# Patient Record
Sex: FEMALE | Race: WHITE | Employment: FULL TIME | URBAN - NONMETROPOLITAN AREA
[De-identification: names, ages, dates, MRNs, and addresses within clinical notes are randomized per-mention and may not be internally consistent; named-entity substitution may affect disease eponyms.]

---

## 2021-12-28 ENCOUNTER — HOSPITAL ENCOUNTER (EMERGENCY)
Age: 21
Discharge: HOME | End: 2021-12-28
Attending: EMERGENCY MEDICINE
Payer: COMMERCIAL

## 2021-12-28 VITALS
WEIGHT: 125 LBS | HEART RATE: 119 BPM | RESPIRATION RATE: 16 BRPM | BODY MASS INDEX: 20.83 KG/M2 | OXYGEN SATURATION: 98 % | TEMPERATURE: 100 F | HEIGHT: 65 IN | DIASTOLIC BLOOD PRESSURE: 60 MMHG | SYSTOLIC BLOOD PRESSURE: 131 MMHG

## 2021-12-28 DIAGNOSIS — U07.1 COVID-19: Primary | ICD-10-CM

## 2021-12-28 LAB
FLU A ANTIGEN: NEGATIVE
FLU B ANTIGEN: NEGATIVE
SARS-COV-2, NAAT: DETECTED

## 2021-12-28 PROCEDURE — 87635 SARS-COV-2 COVID-19 AMP PRB: CPT

## 2021-12-28 PROCEDURE — 99284 EMERGENCY DEPT VISIT MOD MDM: CPT

## 2021-12-28 PROCEDURE — 87804 INFLUENZA ASSAY W/OPTIC: CPT

## 2021-12-28 PROCEDURE — 6370000000 HC RX 637 (ALT 250 FOR IP): Performed by: EMERGENCY MEDICINE

## 2021-12-28 RX ORDER — ASCORBIC ACID 500 MG
500 TABLET ORAL 2 TIMES DAILY
Qty: 14 TABLET | Refills: 0 | Status: SHIPPED | OUTPATIENT
Start: 2021-12-28 | End: 2022-01-04

## 2021-12-28 RX ORDER — IBUPROFEN 200 MG
600 TABLET ORAL ONCE
Status: COMPLETED | OUTPATIENT
Start: 2021-12-28 | End: 2021-12-28

## 2021-12-28 RX ORDER — ZINC SULFATE 50(220)MG
50 CAPSULE ORAL DAILY
Qty: 7 CAPSULE | Refills: 0 | Status: SHIPPED | OUTPATIENT
Start: 2021-12-28 | End: 2022-01-10

## 2021-12-28 RX ADMIN — IBUPROFEN 600 MG: 200 TABLET, FILM COATED ORAL at 00:52

## 2021-12-28 ASSESSMENT — PAIN DESCRIPTION - DESCRIPTORS
DESCRIPTORS: DISCOMFORT;CONSTANT
DESCRIPTORS: DISCOMFORT

## 2021-12-28 ASSESSMENT — PAIN SCALES - GENERAL
PAINLEVEL_OUTOF10: 7
PAINLEVEL_OUTOF10: 5
PAINLEVEL_OUTOF10: 7

## 2021-12-28 ASSESSMENT — PAIN DESCRIPTION - LOCATION
LOCATION: BACK
LOCATION: HEAD

## 2021-12-28 ASSESSMENT — PAIN DESCRIPTION - ORIENTATION: ORIENTATION: LOWER

## 2021-12-28 NOTE — ED TRIAGE NOTES
Patient reported, \"fever, cough started tonight. I thought it was a migraine today and I took Excedrin migraine around 1600. My brother was diagnosed with Covid 2 weeks ago and I went and got tested at that time and was negative. Yesterday I developed a slight sore throat but that is gone. My heart was beating so fast I couldn't sleep and the fever keeps going up. \" Observed patient resp easy, warm and dry.

## 2021-12-28 NOTE — ED PROVIDER NOTES
[address] [facility]  [facility] [phone]  Phone: [address]    Chief Complaint   Patient presents with    Fever       HPI    Patel Briones is a 24 y.o. female who presents with noted complaint. Patient has been doing okay developed little bit of rhinorrhea last couple days. That got a little bit better now is developed high fever. No other associate symptoms such as vomiting or diarrhea. Little rhinorrhea. Little sore throat improved. Some back pain and myalgias. Had exposure to brother with Covid although that was sometime ago.     PAST MEDICAL HISTORY    Past Medical History:   Diagnosis Date    Migraines        SURGICAL HISTORY    Past Surgical History:   Procedure Laterality Date    APPENDECTOMY  3/12    Dr. Stephane Dale  2007    left side of head-cyst removed       CURRENT MEDICATIONS    Current Outpatient Rx   Medication Sig Dispense Refill    Aspirin-Acetaminophen-Caffeine (EXCEDRIN MIGRAINE PO) Take by mouth      ascorbic acid (VITAMIN C) 500 MG tablet Take 1 tablet by mouth 2 times daily for 7 days 14 tablet 0    zinc sulfate (ZINCATE) 220 (50 Zn) MG capsule Take 1 capsule by mouth daily for 7 days 7 capsule 0       ALLERGIES    Allergies   Allergen Reactions    Cefaclor Hives       FAMILY HISTORY    Family History   Problem Relation Age of Onset    Urolithiasis Father        SOCIAL HISTORY    Social History     Socioeconomic History    Marital status: Single     Spouse name: None    Number of children: None    Years of education: None    Highest education level: None   Occupational History    Occupation: student   Tobacco Use    Smoking status: Never Smoker    Smokeless tobacco: Never Used   Vaping Use    Vaping Use: Never used   Substance and Sexual Activity    Alcohol use: No    Drug use: No    Sexual activity: None   Other Topics Concern    None   Social History Narrative  None     Social Determinants of Health     Financial Resource Strain:     Difficulty of Paying Living Expenses: Not on file   Food Insecurity:     Worried About Running Out of Food in the Last Year: Not on file    Jairon of Food in the Last Year: Not on file   Transportation Needs:     Lack of Transportation (Medical): Not on file    Lack of Transportation (Non-Medical): Not on file   Physical Activity:     Days of Exercise per Week: Not on file    Minutes of Exercise per Session: Not on file   Stress:     Feeling of Stress : Not on file   Social Connections:     Frequency of Communication with Friends and Family: Not on file    Frequency of Social Gatherings with Friends and Family: Not on file    Attends Christian Services: Not on file    Active Member of [address] [place] [address] [employer] or Organizations: Not on file    Attends Club or Organization Meetings: Not on file    Marital Status: Not on file   Intimate Partner Violence:     Fear of Current or Ex-Partner: Not on file    Emotionally Abused: Not on file    Physically Abused: Not on file    Sexually Abused: Not on file   Housing Stability:     Unable to Pay for Housing in the Last Year: Not on file    Number of [place] in the Last Year: Not on file    Unstable Housing in the Last Year: Not on file       REVIEW OF SYSTEMS    Positive for recent URI and cough. Neck pain chest pain or abdominal pain or shortness of breath. Positive for some recurrent headaches. All systems negative except as marked.      PHYSICAL EXAM    VITAL SIGNS: BP (!) 146/71   Pulse 135   Temp 101.6 °F (38.7 °C) (Oral)   Resp 16   Ht 5' 5\" (1.651 m)   Wt 125 lb (56.7 kg)   LMP 12/19/2021 (Exact Date)   SpO2 96%   BMI 20.80 kg/m²    Constitutional:  Alert not toxic or ill, able to give coherent history pleasant alert  HENT:  normocephalic, Atraumatic,  Bilateral external ears normal, Oropharynx moist, No oral exudates, Nose normal.  TMs normal  Cervical Spine: Normal range of motion,  No stridor. No tenderness, Supple,  Eyes:  No discharge or  Swelling,Conjunctiva normal, PERRL, EOMI,  Respiratory: No respiratory distress, Normal breath sounds,  No wheezing, No chest tenderness. Cardiovascular:  Normal heart rate, Normal rhythm, No murmurs, No rubs, No gallops. GI:  No reproducible pain, Bowel sounds normal, Soft, No masses, No pulsatile masses. No tenderness  Musculoskeletal:  Intact distal pulses, No edema, No tenderness, No cyanosis, No clubbing. Good range of motion in all major joints. No tenderness to palpation or major deformities noted. Back:No tenderness. Integument:  Warm, Dry, No erythema, No rash (on exposed areas)   Lymphatic:  No lymphadenopathy noted. Neurologic:  Alert & oriented x 3, Normal motor function, Normal sensory function, No focal deficits noted. Psychiatric:  Affect normal, Judgment normal, Mood normal.     EKG                      RADIOLOGY    No orders to display       PROCEDURES    none      CONSULTS:  None      CRITICAL CARE:  None      SCREENINGS  BP (!) 146/71   Pulse 135   Temp 101.6 °F (38.7 °C) (Oral)   Resp 16   Ht 5' 5\" (1.651 m)   Wt 125 lb (56.7 kg)   LMP 12/19/2021 (Exact Date)   SpO2 96%   BMI 20.80 kg/m²        Screening For Hypertension and Follow-up (#317)  patient informed that blood pressure is abnormal and in the pre-hypertension range and should be rechecked by primary care      Screening For Tobacco Use and Cessation Intervention (#226):   reports that she has never smoked. She has never used smokeless tobacco.  Non-smoker not applicable for screen      ED COURSE & MEDICAL DECISION MAKING    Pertinent Labs & Imaging studies reviewed. (See chart for details)  Patient has acute febrile illness. Symptoms been present for several days. Does have some myalgias and body aches. Could represent a viral or flulike illness. She has got no vomiting or diarrhea. She has no urinary symptoms currently.   Checking Covid test. She is outside the window for further treatment in regards to influenza. She has no urinary symptoms to suggest UTI/pyelonephritis        REASSESSMENT  1:06 AM  Patient rechecked and updated on lab/xray status, progress and results. Patient was reassessed and condition was unchanged after treatment Motrin for pain. . Needs nothing else at this time. Covid test is positive. Recommend supportive care antipyretics etc.  Offered her nausea medication cough medication etc. she did not want any          ED Course as of 12/28/21 0106   Tue Dec 28, 2021   0058 Covid test is positive at this time. Recommend further evaluation as an outpatient. Reassured patient in regards to care and treatment.   Continue fever medication and monitor. [SY]      ED Course User Index  [SY] Americo Sharma MD          FINAL IMPRESSION    1. COVID-19         PATIENT REFERRED TO:  Brittany Simons MD  [address] [place] [facility] [address]  [phone]    Call   For evaluation      DISCHARGE MEDICATIONS:  New Prescriptions    ASCORBIC ACID (VITAMIN C) 500 MG TABLET    Take 1 tablet by mouth 2 times daily for 7 days    ZINC SULFATE (ZINCATE) 220 (50 ZN) MG CAPSULE    Take 1 capsule by mouth daily for 7 days           Americo Sharma MD  12/28/21 0106

## 2021-12-28 NOTE — ED NOTES
Observed patient resp easy, warm and dry, sitting on the bed. Patient stable for discharge, instructions provided. Patient educated on medications, pain control, isolation, and symptoms management, and follow up. Patient verbalized understanding, questions answered. Observed patient steadily ambulate from the department after discharge with family.       Yaa Brizuela RN  12/28/21 3092

## 2021-12-28 NOTE — Clinical Note
Ines Go was seen and treated in our emergency department on 12/28/2021. She may return to school on 01/03/2022. If no fever or symptoms and recommended mask until January 7    If you have any questions or concerns, please don't hesitate to call.       Prabha Abad MD

## 2021-12-29 ENCOUNTER — TELEPHONE (OUTPATIENT)
Dept: FAMILY MEDICINE CLINIC | Age: 21
End: 2021-12-29

## 2021-12-29 ENCOUNTER — CARE COORDINATION (OUTPATIENT)
Dept: CARE COORDINATION | Age: 21
End: 2021-12-29

## 2021-12-29 NOTE — ACP (ADVANCE CARE PLANNING)
Advance Care Planning   Healthcare Decision Maker:    Primary Decision Maker: Faith Boles - Parent - [phone]    Primary Decision Maker: Carlotta Ojeda - Parent - [phone]    Today we documented Decision Maker(s) consistent with Legal Next of Kin hierarchy. Healthcare Decision Maker information reviewed, updated, and verified with patient.

## 2021-12-29 NOTE — CARE COORDINATION
Attempted to reach patient for covid-19 f/u s/p recent [facility] ED visit for c/o fever. COVID-19 testing was completed and results were positive. Patient was not available at the time of my call and generic voicemail message was left asking patient to please return call to my direct number. Will continue to attempt to f/u with patient in the future.

## 2021-12-29 NOTE — CARE COORDINATION
Patient contacted regarding COVID-19 diagnosis. Discussed COVID-19 related testing which was available at this time. Test results were positive. Patient informed of results, if available? Patient states she is aware of COVID-19 results and they were again reviewed with patient during our call. Ambulatory Care Manager contacted the patient by telephone to perform post discharge assessment. Call within 2 business days of discharge: Yes. Verified name and  with patient as identifiers. Provided introduction to self, and explanation of the CTN/ACM role, and reason for call due to risk factors for infection and/or exposure to COVID-19. Symptoms reviewed with patient who verbalized the following symptoms: fever, no new symptoms and no worsening symptoms. Due to no new or worsening symptoms encounter was not routed to provider for escalation. Discussed follow-up appointments. If no appointment was previously scheduled, appointment scheduling offered: Patient will call and schedule PCP f/u appointment and she declined any assistance. [address] follow up appointment(s): No future appointments. Non-[facility] follow up appointment(s): N/A     Non-face-to-face services provided:  Obtained and reviewed discharge summary and/or continuity of care documents  Education of patient/family/caregiver/guardian to support self-management-COVID-19 education and zone information reviewed with patient. Patient was educated on signs/symptoms to report as well as the importance of early symptom recognition. ACM educated on need to self quarantine as directed. Advance Care Planning:   Does patient have an Advance Directive:  decision maker updated. ACP note completed. Educated patient about risk for severe COVID-19 due to risk factors according to CDC guidelines. ACM reviewed discharge instructions, medical action plan and red flag symptoms with the patient who verbalized understanding.  Discussed COVID vaccination status:

## 2022-01-10 ENCOUNTER — TELEMEDICINE (OUTPATIENT)
Dept: FAMILY MEDICINE CLINIC | Age: 22
End: 2022-01-10
Payer: COMMERCIAL

## 2022-01-10 DIAGNOSIS — U07.1 COVID-19: Primary | ICD-10-CM

## 2022-01-10 PROCEDURE — 99213 OFFICE O/P EST LOW 20 MIN: CPT | Performed by: FAMILY MEDICINE

## 2022-01-10 ASSESSMENT — ENCOUNTER SYMPTOMS
ABDOMINAL PAIN: 0
WHEEZING: 0
SHORTNESS OF BREATH: 0
DIARRHEA: 0
COUGH: 0
CONSTIPATION: 0
SORE THROAT: 0
RHINORRHEA: 0
NAUSEA: 0

## 2022-01-10 ASSESSMENT — PATIENT HEALTH QUESTIONNAIRE - PHQ9
SUM OF ALL RESPONSES TO PHQ QUESTIONS 1-9: 0
2. FEELING DOWN, DEPRESSED OR HOPELESS: 0
SUM OF ALL RESPONSES TO PHQ9 QUESTIONS 1 & 2: 0
SUM OF ALL RESPONSES TO PHQ QUESTIONS 1-9: 0
SUM OF ALL RESPONSES TO PHQ QUESTIONS 1-9: 0
1. LITTLE INTEREST OR PLEASURE IN DOING THINGS: 0
SUM OF ALL RESPONSES TO PHQ QUESTIONS 1-9: 0

## 2022-01-10 NOTE — PROGRESS NOTES
Head: Normocephalic and atraumatic. Eyes:      General: No scleral icterus. Pulmonary:      Effort: Pulmonary effort is normal. No respiratory distress. Skin:     Coloration: Skin is not jaundiced. Neurological:      Mental Status: She is alert and oriented to person, place, and time. Psychiatric:         Mood and Affect: Mood normal.         Behavior: Behavior normal.         Thought Content: Thought content normal.         Judgment: Judgment normal.         There were no vitals filed for this visit. Ronna Serrano, was evaluated through a synchronous (real-time) audio-video encounter. The patient (or guardian if applicable) is aware that this is a billable service. Verbal consent to proceed has been obtained within the past 12 months. The visit was conducted pursuant to the emergency declaration under the [address] [place] [address] authority and the [employer] General Act. Patient identification was verified, and a caregiver was present when appropriate. The patient was located in a state where the provider was credentialed to provide care. An electronic signature was used to authenticate this note.     --Fidel Whitt MD

## 2022-04-11 ENCOUNTER — TELEPHONE (OUTPATIENT)
Dept: FAMILY MEDICINE CLINIC | Age: 22
End: 2022-04-11

## 2022-04-11 NOTE — TELEPHONE ENCOUNTER
----- Message from Ceciliabebeto Sierra sent at 4/11/2022  9:46 AM EDT -----  Subject: Message to Provider    QUESTIONS  Information for Provider? Patient has lab orders from school to get blood   drawn since she got the Hep vaccines to see if she is immune to it and   wants to know if she can come to office to have done.  ---------------------------------------------------------------------------  --------------  [address]  What is the best way for the office to contact you? OK to leave message on   voicemail  Preferred Call Back Phone Number?  [phone]  ---------------------------------------------------------------------------  --------------  SCRIPT ANSWERS  undefined

## 2022-04-13 ENCOUNTER — NURSE ONLY (OUTPATIENT)
Dept: FAMILY MEDICINE CLINIC | Age: 22
End: 2022-04-13
Payer: COMMERCIAL

## 2022-04-13 DIAGNOSIS — Z11.59 NEED FOR HEPATITIS B SCREENING TEST: Primary | ICD-10-CM

## 2022-04-13 PROCEDURE — 36415 COLL VENOUS BLD VENIPUNCTURE: CPT | Performed by: FAMILY MEDICINE

## 2022-04-14 LAB — HEPATITIS B SURFACE ANTIGEN: NEGATIVE

## 2024-05-22 ENCOUNTER — OFFICE VISIT (OUTPATIENT)
Dept: PRIMARY CARE CLINIC | Age: 24
End: 2024-05-22
Payer: COMMERCIAL

## 2024-05-22 VITALS
HEIGHT: 65 IN | HEART RATE: 81 BPM | SYSTOLIC BLOOD PRESSURE: 118 MMHG | BODY MASS INDEX: 23.2 KG/M2 | RESPIRATION RATE: 16 BRPM | DIASTOLIC BLOOD PRESSURE: 70 MMHG | OXYGEN SATURATION: 99 % | WEIGHT: 139.25 LBS | TEMPERATURE: 98.2 F

## 2024-05-22 DIAGNOSIS — J02.0 STREP PHARYNGITIS: Primary | ICD-10-CM

## 2024-05-22 PROCEDURE — 99203 OFFICE O/P NEW LOW 30 MIN: CPT | Performed by: NURSE PRACTITIONER

## 2024-05-22 RX ORDER — PREDNISONE 20 MG/1
20 TABLET ORAL 2 TIMES DAILY
Qty: 10 TABLET | Refills: 0 | Status: SHIPPED | OUTPATIENT
Start: 2024-05-22 | End: 2024-05-27

## 2024-05-22 RX ORDER — AZITHROMYCIN 250 MG/1
250 TABLET, FILM COATED ORAL SEE ADMIN INSTRUCTIONS
Qty: 6 TABLET | Refills: 0 | Status: SHIPPED | OUTPATIENT
Start: 2024-05-22 | End: 2024-05-27

## 2024-05-22 ASSESSMENT — ENCOUNTER SYMPTOMS
SWOLLEN GLANDS: 1
COUGH: 1
SORE THROAT: 1

## 2024-05-22 NOTE — PROGRESS NOTES
Subjective:      Patient ID: Myrna Kwan is a 23 y.o. female coming in for   Chief Complaint   Patient presents with    Pharyngitis     She reports throat hurts, tightness, headache, coughing and its productive this has been going on with 2 weeks         Pharyngitis  This is a new problem. Episode onset: x2weeks. Associated symptoms include congestion, coughing, headaches, myalgias, a sore throat and swollen glands. Pertinent negatives include no fever. Treatments tried: nasonex, motrin as needed.       Review of Systems   Constitutional:  Negative for fever.   HENT:  Positive for congestion and sore throat.    Respiratory:  Positive for cough.    Musculoskeletal:  Positive for myalgias.   Neurological:  Positive for headaches.        Objective:/70 (Site: Left Upper Arm, Position: Sitting, Cuff Size: Medium Adult)   Pulse 81   Temp 98.2 °F (36.8 °C) (Tympanic)   Resp 16   Ht 1.651 m (5' 5\")   Wt 63.2 kg (139 lb 4 oz)   LMP 05/05/2024   SpO2 99%   BMI 23.17 kg/m²      Physical Exam  Vitals and nursing note reviewed.   Constitutional:       General: She is not in acute distress.     Appearance: Normal appearance. She is not ill-appearing.   HENT:      Head: Normocephalic.      Mouth/Throat:      Mouth: Mucous membranes are moist.      Pharynx: Oropharynx is clear. Posterior oropharyngeal erythema present.   Cardiovascular:      Rate and Rhythm: Normal rate and regular rhythm.      Heart sounds: Normal heart sounds.   Pulmonary:      Effort: Pulmonary effort is normal.      Breath sounds: Normal breath sounds.   Musculoskeletal:      Cervical back: Normal range of motion and neck supple.      Right lower leg: No edema.      Left lower leg: No edema.   Lymphadenopathy:      Cervical: No cervical adenopathy.   Skin:     General: Skin is warm and dry.      Findings: No rash.   Neurological:      General: No focal deficit present.      Mental Status: She is alert and oriented to person, place, and time.

## 2024-05-22 NOTE — PATIENT INSTRUCTIONS
Treating symptoms as strep throat with zpack which covers lungs and ears as well. Does not cover sinuses well. Prednisone for inflammation  F/u with pcp as needed.

## 2025-06-11 ENCOUNTER — RESULTS FOLLOW-UP (OUTPATIENT)
Dept: FAMILY MEDICINE CLINIC | Age: 25
End: 2025-06-11

## 2025-06-11 ENCOUNTER — OFFICE VISIT (OUTPATIENT)
Dept: FAMILY MEDICINE CLINIC | Age: 25
End: 2025-06-11
Payer: COMMERCIAL

## 2025-06-11 VITALS
RESPIRATION RATE: 18 BRPM | WEIGHT: 141.4 LBS | OXYGEN SATURATION: 98 % | HEIGHT: 65 IN | HEART RATE: 68 BPM | TEMPERATURE: 98.2 F | SYSTOLIC BLOOD PRESSURE: 110 MMHG | BODY MASS INDEX: 23.56 KG/M2 | DIASTOLIC BLOOD PRESSURE: 74 MMHG

## 2025-06-11 DIAGNOSIS — N92.0 MENORRHAGIA WITH REGULAR CYCLE: ICD-10-CM

## 2025-06-11 DIAGNOSIS — Z00.00 ANNUAL PHYSICAL EXAM: Primary | ICD-10-CM

## 2025-06-11 LAB
ALBUMIN SERPL BCG-MCNC: 3.9 G/DL (ref 3.4–4.9)
ALP SERPL-CCNC: 76 U/L (ref 38–126)
ALT SERPL W/O P-5'-P-CCNC: 6 U/L (ref 10–35)
ANION GAP SERPL CALC-SCNC: 9 MEQ/L (ref 8–16)
AST SERPL-CCNC: 26 U/L (ref 10–35)
BASOPHILS ABSOLUTE: 0 THOU/MM3 (ref 0–0.1)
BASOPHILS NFR BLD AUTO: 0.7 %
BILIRUB SERPL-MCNC: 0.4 MG/DL (ref 0.3–1.2)
BUN SERPL-MCNC: 11 MG/DL (ref 8–23)
CALCIUM SERPL-MCNC: 8.9 MG/DL (ref 8.6–10)
CHLORIDE SERPL-SCNC: 106 MEQ/L (ref 98–111)
CHOLEST SERPL-MCNC: 152 MG/DL (ref 100–199)
CO2 SERPL-SCNC: 22 MEQ/L (ref 22–29)
CREAT SERPL-MCNC: 0.9 MG/DL (ref 0.5–0.9)
DEPRECATED RDW RBC AUTO: 39.8 FL (ref 35–45)
EOSINOPHIL NFR BLD AUTO: 2.5 %
EOSINOPHILS ABSOLUTE: 0.1 THOU/MM3 (ref 0–0.4)
ERYTHROCYTE [DISTWIDTH] IN BLOOD BY AUTOMATED COUNT: 11.9 % (ref 11.5–14.5)
GFR SERPL CREATININE-BSD FRML MDRD: > 90 ML/MIN/1.73M2
GLUCOSE SERPL-MCNC: 92 MG/DL (ref 74–109)
HCT VFR BLD AUTO: 43.8 % (ref 37–47)
HDLC SERPL-MCNC: 66 MG/DL
HGB BLD-MCNC: 14.2 GM/DL (ref 12–16)
IMM GRANULOCYTES # BLD AUTO: 0.01 THOU/MM3 (ref 0–0.07)
IMM GRANULOCYTES NFR BLD AUTO: 0.2 %
LDLC SERPL CALC-MCNC: 70 MG/DL
LYMPHOCYTES ABSOLUTE: 2 THOU/MM3 (ref 1–4.8)
LYMPHOCYTES NFR BLD AUTO: 35.9 %
MCH RBC QN AUTO: 29.6 PG (ref 26–33)
MCHC RBC AUTO-ENTMCNC: 32.4 GM/DL (ref 32.2–35.5)
MCV RBC AUTO: 91.3 FL (ref 81–99)
MONOCYTES ABSOLUTE: 0.5 THOU/MM3 (ref 0.4–1.3)
MONOCYTES NFR BLD AUTO: 9.3 %
NEUTROPHILS ABSOLUTE: 2.9 THOU/MM3 (ref 1.8–7.7)
NEUTROPHILS NFR BLD AUTO: 51.4 %
NRBC BLD AUTO-RTO: 0 /100 WBC
PLATELET # BLD AUTO: 284 THOU/MM3 (ref 130–400)
PMV BLD AUTO: 10.2 FL (ref 9.4–12.4)
POTASSIUM SERPL-SCNC: 4.6 MEQ/L (ref 3.5–5.2)
PROT SERPL-MCNC: 7 G/DL (ref 6.4–8.3)
RBC # BLD AUTO: 4.8 MILL/MM3 (ref 4.2–5.4)
SODIUM SERPL-SCNC: 137 MEQ/L (ref 135–145)
TRIGL SERPL-MCNC: 78 MG/DL (ref 0–199)
TSH SERPL DL<=0.05 MIU/L-ACNC: 2.4 UIU/ML (ref 0.27–4.2)
WBC # BLD AUTO: 5.7 THOU/MM3 (ref 4.8–10.8)

## 2025-06-11 PROCEDURE — 99385 PREV VISIT NEW AGE 18-39: CPT | Performed by: FAMILY MEDICINE

## 2025-06-11 PROCEDURE — 36415 COLL VENOUS BLD VENIPUNCTURE: CPT | Performed by: FAMILY MEDICINE

## 2025-06-11 SDOH — ECONOMIC STABILITY: FOOD INSECURITY: WITHIN THE PAST 12 MONTHS, YOU WORRIED THAT YOUR FOOD WOULD RUN OUT BEFORE YOU GOT MONEY TO BUY MORE.: NEVER TRUE

## 2025-06-11 SDOH — ECONOMIC STABILITY: FOOD INSECURITY: WITHIN THE PAST 12 MONTHS, THE FOOD YOU BOUGHT JUST DIDN'T LAST AND YOU DIDN'T HAVE MONEY TO GET MORE.: NEVER TRUE

## 2025-06-11 ASSESSMENT — ENCOUNTER SYMPTOMS
DIARRHEA: 0
NAUSEA: 0
RHINORRHEA: 0
SORE THROAT: 0
APNEA: 0
SINUS PRESSURE: 0
CONSTIPATION: 0
COUGH: 0
BACK PAIN: 0
WHEEZING: 0
COLOR CHANGE: 0
SHORTNESS OF BREATH: 0
ABDOMINAL PAIN: 0
VOMITING: 0

## 2025-06-11 ASSESSMENT — PATIENT HEALTH QUESTIONNAIRE - PHQ9
SUM OF ALL RESPONSES TO PHQ QUESTIONS 1-9: 0
1. LITTLE INTEREST OR PLEASURE IN DOING THINGS: NOT AT ALL
1. LITTLE INTEREST OR PLEASURE IN DOING THINGS: NOT AT ALL
2. FEELING DOWN, DEPRESSED OR HOPELESS: NOT AT ALL
SUM OF ALL RESPONSES TO PHQ QUESTIONS 1-9: 0
SUM OF ALL RESPONSES TO PHQ QUESTIONS 1-9: 0
SUM OF ALL RESPONSES TO PHQ9 QUESTIONS 1 & 2: 0
2. FEELING DOWN, DEPRESSED OR HOPELESS: NOT AT ALL
SUM OF ALL RESPONSES TO PHQ QUESTIONS 1-9: 0

## 2025-06-11 NOTE — PROGRESS NOTES
2025    Myrna Kwan (:  2000) is a 24 y.o. female, here for a preventive medicine evaluation.    Subjective   There is no problem list on file for this patient.      Review of Systems   Constitutional:  Positive for fatigue (during menstrual cycle). Negative for appetite change, chills and fever.   HENT:  Negative for congestion, postnasal drip, rhinorrhea, sinus pressure and sore throat.    Respiratory:  Negative for apnea, cough, shortness of breath and wheezing.    Cardiovascular:  Negative for chest pain, palpitations and leg swelling.   Gastrointestinal:  Negative for abdominal pain, constipation, diarrhea, nausea and vomiting.   Genitourinary:  Positive for menstrual problem (heavy). Negative for difficulty urinating, dysuria, frequency and urgency.   Musculoskeletal:  Negative for back pain and myalgias.   Skin:  Negative for color change and rash.   Neurological:  Negative for dizziness, light-headedness and headaches.   Psychiatric/Behavioral:  Negative for decreased concentration, dysphoric mood and sleep disturbance. The patient is not nervous/anxious.        Prior to Visit Medications    Medication Sig Taking? Authorizing Provider   ascorbic acid (VITAMIN C) 500 MG tablet Take 1 tablet by mouth 2 times daily for 7 days Yes Venkat Costa MD   Aspirin-Acetaminophen-Caffeine (EXCEDRIN MIGRAINE PO) Take by mouth  Patient not taking: Reported on 2024  ProviderAyden MD        Allergies   Allergen Reactions    Cefaclor Hives    Environmental/Seasonal Other (See Comments)       Past Medical History:   Diagnosis Date    Migraines        Past Surgical History:   Procedure Laterality Date    APPENDECTOMY  3/12    Dr. Martin    CYST REMOVAL      left side of head-cyst removed       Social History     Socioeconomic History    Marital status: Single     Spouse name: Not on file    Number of children: Not on file    Years of education: Not on file    Highest education